# Patient Record
Sex: MALE | Race: OTHER | HISPANIC OR LATINO
[De-identification: names, ages, dates, MRNs, and addresses within clinical notes are randomized per-mention and may not be internally consistent; named-entity substitution may affect disease eponyms.]

---

## 2018-01-30 PROBLEM — Z00.00 ENCOUNTER FOR PREVENTIVE HEALTH EXAMINATION: Status: ACTIVE | Noted: 2018-01-30

## 2018-02-09 ENCOUNTER — APPOINTMENT (OUTPATIENT)
Dept: GASTROENTEROLOGY | Facility: CLINIC | Age: 38
End: 2018-02-09
Payer: COMMERCIAL

## 2018-02-09 VITALS
HEART RATE: 80 BPM | DIASTOLIC BLOOD PRESSURE: 80 MMHG | RESPIRATION RATE: 16 BRPM | SYSTOLIC BLOOD PRESSURE: 130 MMHG | OXYGEN SATURATION: 100 % | BODY MASS INDEX: 28.25 KG/M2 | HEIGHT: 67 IN | WEIGHT: 180 LBS

## 2018-02-09 PROCEDURE — 99203 OFFICE O/P NEW LOW 30 MIN: CPT

## 2018-05-04 LAB
ALBUMIN SERPL ELPH-MCNC: 4.5 G/DL
ALP BLD-CCNC: 65 U/L
ALT SERPL-CCNC: 63 U/L
ANION GAP SERPL CALC-SCNC: 13 MMOL/L
AST SERPL-CCNC: 28 U/L
BASOPHILS # BLD AUTO: 0.03 K/UL
BASOPHILS NFR BLD AUTO: 0.3 %
BILIRUB SERPL-MCNC: 0.4 MG/DL
BUN SERPL-MCNC: 20 MG/DL
CALCIUM SERPL-MCNC: 9.4 MG/DL
CHLORIDE SERPL-SCNC: 103 MMOL/L
CO2 SERPL-SCNC: 24 MMOL/L
CREAT SERPL-MCNC: 0.98 MG/DL
EOSINOPHIL # BLD AUTO: 0.05 K/UL
EOSINOPHIL NFR BLD AUTO: 0.6 %
GLUCOSE SERPL-MCNC: 92 MG/DL
HCT VFR BLD CALC: 41.8 %
HGB BLD-MCNC: 13.7 G/DL
IMM GRANULOCYTES NFR BLD AUTO: 0.3 %
INR PPP: 0.94 RATIO
LYMPHOCYTES # BLD AUTO: 3.26 K/UL
LYMPHOCYTES NFR BLD AUTO: 37.3 %
MAN DIFF?: NORMAL
MCHC RBC-ENTMCNC: 29.7 PG
MCHC RBC-ENTMCNC: 32.8 GM/DL
MCV RBC AUTO: 90.7 FL
MONOCYTES # BLD AUTO: 0.96 K/UL
MONOCYTES NFR BLD AUTO: 11 %
NEUTROPHILS # BLD AUTO: 4.41 K/UL
NEUTROPHILS NFR BLD AUTO: 50.5 %
PLATELET # BLD AUTO: 194 K/UL
POTASSIUM SERPL-SCNC: 4.6 MMOL/L
PROT SERPL-MCNC: 7.2 G/DL
PT BLD: 10.6 SEC
RBC # BLD: 4.61 M/UL
RBC # FLD: 13.8 %
SODIUM SERPL-SCNC: 140 MMOL/L
UREA BREATH TEST QL: POSITIVE
WBC # FLD AUTO: 8.74 K/UL

## 2018-05-14 ENCOUNTER — APPOINTMENT (OUTPATIENT)
Dept: GASTROENTEROLOGY | Facility: GI CENTER | Age: 38
End: 2018-05-14
Payer: COMMERCIAL

## 2018-05-14 ENCOUNTER — OUTPATIENT (OUTPATIENT)
Dept: OUTPATIENT SERVICES | Facility: HOSPITAL | Age: 38
LOS: 1 days | End: 2018-05-14
Payer: COMMERCIAL

## 2018-05-14 DIAGNOSIS — Z86.010 PERSONAL HISTORY OF COLONIC POLYPS: ICD-10-CM

## 2018-05-14 DIAGNOSIS — K92.1 MELENA: ICD-10-CM

## 2018-05-14 PROCEDURE — 45378 DIAGNOSTIC COLONOSCOPY: CPT

## 2018-05-14 PROCEDURE — T1013: CPT

## 2018-09-26 ENCOUNTER — APPOINTMENT (OUTPATIENT)
Dept: GASTROENTEROLOGY | Facility: CLINIC | Age: 38
End: 2018-09-26
Payer: COMMERCIAL

## 2018-09-26 VITALS
WEIGHT: 180 LBS | DIASTOLIC BLOOD PRESSURE: 80 MMHG | SYSTOLIC BLOOD PRESSURE: 110 MMHG | OXYGEN SATURATION: 99 % | HEIGHT: 67 IN | RESPIRATION RATE: 15 BRPM | HEART RATE: 75 BPM | BODY MASS INDEX: 28.25 KG/M2

## 2018-09-26 DIAGNOSIS — A04.8 OTHER SPECIFIED BACTERIAL INTESTINAL INFECTIONS: ICD-10-CM

## 2018-09-26 DIAGNOSIS — K64.8 OTHER HEMORRHOIDS: ICD-10-CM

## 2018-09-26 LAB — UREA BREATH TEST QL: NEGATIVE

## 2018-09-26 PROCEDURE — 99213 OFFICE O/P EST LOW 20 MIN: CPT

## 2018-09-26 RX ORDER — METRONIDAZOLE 500 MG/1
500 TABLET ORAL TWICE DAILY
Qty: 28 | Refills: 0 | Status: DISCONTINUED | COMMUNITY
Start: 2018-05-14 | End: 2018-09-26

## 2018-09-26 RX ORDER — AMOXICILLIN 500 MG/1
500 TABLET, FILM COATED ORAL TWICE DAILY
Qty: 56 | Refills: 0 | Status: DISCONTINUED | COMMUNITY
Start: 2018-05-14 | End: 2018-09-26

## 2018-09-26 RX ORDER — CLARITHROMYCIN 500 MG/1
500 TABLET, FILM COATED ORAL
Qty: 20 | Refills: 0 | Status: DISCONTINUED | COMMUNITY
Start: 2018-05-14 | End: 2018-09-26

## 2018-09-26 RX ORDER — PANTOPRAZOLE 40 MG/1
40 TABLET, DELAYED RELEASE ORAL
Qty: 28 | Refills: 0 | Status: DISCONTINUED | COMMUNITY
Start: 2018-05-14 | End: 2018-09-26

## 2018-09-26 RX ORDER — POLYETHYLENE GLYOCOL 3350, SODIUM CHLORIDE, SODIUM BICARBONATE AND POTASSIUM CHLORIDE 420; 11.2; 5.72; 1.48 G/4L; G/4L; G/4L; G/4L
420 POWDER, FOR SOLUTION NASOGASTRIC; ORAL
Qty: 1 | Refills: 0 | Status: DISCONTINUED | COMMUNITY
Start: 2018-02-09 | End: 2018-09-26

## 2021-03-21 ENCOUNTER — OUTPATIENT (OUTPATIENT)
Dept: OUTPATIENT SERVICES | Facility: HOSPITAL | Age: 41
LOS: 1 days | End: 2021-03-21
Payer: COMMERCIAL

## 2021-03-21 ENCOUNTER — TRANSCRIPTION ENCOUNTER (OUTPATIENT)
Age: 41
End: 2021-03-21

## 2021-03-21 DIAGNOSIS — Z20.828 CONTACT WITH AND (SUSPECTED) EXPOSURE TO OTHER VIRAL COMMUNICABLE DISEASES: ICD-10-CM

## 2021-03-21 LAB — SARS-COV-2 RNA SPEC QL NAA+PROBE: SIGNIFICANT CHANGE UP

## 2021-03-21 PROCEDURE — U0005: CPT

## 2021-03-21 PROCEDURE — U0003: CPT

## 2021-03-21 PROCEDURE — C9803: CPT

## 2021-03-22 DIAGNOSIS — Z20.828 CONTACT WITH AND (SUSPECTED) EXPOSURE TO OTHER VIRAL COMMUNICABLE DISEASES: ICD-10-CM

## 2021-08-12 ENCOUNTER — EMERGENCY (EMERGENCY)
Facility: HOSPITAL | Age: 41
LOS: 1 days | Discharge: HOME | End: 2021-08-12
Attending: EMERGENCY MEDICINE
Payer: COMMERCIAL

## 2021-08-12 VITALS
DIASTOLIC BLOOD PRESSURE: 87 MMHG | HEART RATE: 75 BPM | HEIGHT: 66 IN | RESPIRATION RATE: 18 BRPM | TEMPERATURE: 97 F | SYSTOLIC BLOOD PRESSURE: 149 MMHG | OXYGEN SATURATION: 98 % | WEIGHT: 199.96 LBS

## 2021-08-12 PROCEDURE — 99283 EMERGENCY DEPT VISIT LOW MDM: CPT

## 2021-08-12 RX ORDER — POLYMYXIN B SULF/TRIMETHOPRIM 10000-1/ML
1 DROPS OPHTHALMIC (EYE) EVERY 6 HOURS
Refills: 0 | Status: DISCONTINUED | OUTPATIENT
Start: 2021-08-12 | End: 2021-08-16

## 2021-08-12 RX ADMIN — Medication 1 DROP(S): at 13:43

## 2021-08-12 NOTE — ED PROVIDER NOTE - CARE PROVIDER_API CALL
SUREKHA ELY  Ophthalmology  [address] [place] [address] [address] [place] [id number]  Phone: [phone]  Fax: [phone]  Follow Up Time:    SUREKHA ELY  Ophthalmology  [address] [place] [address] [address]  [place] [address]  Phone: [phone]  Fax: [phone]  Follow Up Time:     Wilman Valverde (MD; PhD)  Ophthalmology  [address] [facility]  [address]  [place] [address]  Phone: [phone]  Fax: [phone]  Follow Up Time:

## 2021-08-12 NOTE — ED PROVIDER NOTE - OBJECTIVE STATEMENT
41 y,o male NO Sig Pmh states in Am felt some foreign body on his left eye and as he rubbed his left eye get worse with left eye redness. and feeling foreign body sensation . he denies any eye contact or eye lens using . denies any loss of vision or floating object

## 2021-08-12 NOTE — ED PROVIDER NOTE - PROVIDER TOKENS
PROVIDER:[TOKEN:[[id number]]] PROVIDER:[TOKEN:[[id number]]],PROVIDER:[TOKEN:[[id number]]]

## 2021-08-12 NOTE — ED PROVIDER NOTE - NSFOLLOWUPINSTRUCTIONS_ED_ALL_ED_FT
Abrasión corneal    LO QUE NECESITA SABER:    ¿Qué es lissy abrasión corneal?Lissy abrasión corneal es lissy lesión, tim un rasguño, en la córnea del clarissa. La córnea es la capa transparente que cubre la parte frontal de salamanca clarissa.    Anatomía del clarissa         ¿Qué causa lissy abrasión corneal?  •Lentes de contacto que no son adecuados, están dañados o que ha usado por demasiado tiempo      •Un rasguño con lissy uña o con algún objeto tim un lápiz o carmen de un árbol      •Objetos tim mugre, arena o virutas de metal que entran en el clarissa      •Friccionarse los ojos con mucha fuerza      •Quemaduras químicas o por exposición a la anuja causadas por el calor extremo      ¿Cuáles son los signos y síntomas de lissy abrasión corneal?  •Dolor, enrojecimiento o hinchazón del clarissa      •Dificultad para abrir el clarissa      •Más lágrimas que lo normal      •Sensación de tener algo en el clarissa      •Visión borrosa      •Sensibilidad a la anuja brillante      •Dolor de noah      ¿Cómo se diagnostica lissy abrasión corneal?Salamanca profesional de suzette le examinará debby ojos. Si encuentra algo en salamanca clarissa que le está rasguñando la córnea, salamanca médico procederá removerlo. Es probable que le ponga un tinte en el clarissa y lo examine con lissy anuja. La anuja y el tinte pueden ayudarle a salamanca médico a alex si salamanca córnea ha sufrido rasguños.             ¿Cómo se trata lissy abrasión corneal?Para ayudar a prevenir lissy infección en el clarissa, es posible que le receten antibióticos en forma de gotas o ungüento para los ojos. Es probable que también le den gotas oftálmicas para disminuir el dolor. Si el rasguño es pequeño, puede sanar en 1 a 2 días. Es posible que si el rasguño es más sherly demore más tiempo en sanar.    ¿Qué puedo hacer para cuidar mis ojos?  •Hágase exámenes de la vista en forma regular.Tenga lissy revisión ocular al menos cada año.      •Consuma alimentos saludables.Las frutas y verduras frescas ricas en vitaminas A y C y podrían ayudarle con la vista. Los alimentos tim la batata, el albaricoque y la zanahoria son ricos en nutrientes para los ojos.  Ko de vitamina A       Ko de vitamina C           •Cuide hitesh los lentes de contactos o anteojos.Almacene, limpie y use los lentes de contacto o anteojos tim se le indique. Reemplácelos con la frecuencia que el médico le recomiende.      •Disminuya la fatiga visual.Descanse los ojos, especialmente después de leer o usar la computadora por mucho tiempo. Duerma lo suficiente por la noche. Use luces que reduzcan el deslumbramiento en el hogar, escuela o lugar de trabajo.      •Use lentes de sol oscuros.Scanlon ayudará a evitar el dolor y la sensibilidad a la anuja. Asegúrese de que los lentes de sol tengan protección contra los sandra UVA y UVB. Scanlon le protegerá los ojos cuando esté al aire silke.      •Use gotas para los ojos de forma akers.Si salamanca plan de tratamiento incluye gotas oftálmicas, es importante utilizarlas tim se indica. Salamanca médico puede darle instrucciones detalladas a seguir. Las gotas oftálmicas también pueden venir con instrucciones de seguridad. Siga todas las instrucciones para ayudar a prevenir lissy infección. No permita que la punta del envase toque salamanca clarissa. Los gérmenes de debby ojos podrían propagarse al envase del medicamento.  Pasos 1 2 3 4           ¿Cómo puedo ayudar a prevenir las lesiones corneales?  •Si debby ojos se sienten secos o irritados, retire los lentes de contacto de debby ojos.      •Lávese las pedro si necesita tocarse los ojos o el jesus.  Lavado de pedro           •Córtese las uñas para que no se pueda rasguñarse el clarissa.      •Use anteojos protectores cuando trabaja con químicos, gerardo, polvo o metal.      •Use anteojos protectores cuando practica deportes.      •No use debby lentes de contacto por más tiempo del debido.      •No duerma con debby lentes de contacto puestos.      •No use maquillaje con escarcha. La escarcha se puede meter en los ojos y por debajo de los lentes de contacto.      ¿Cuándo alicia llamar a mi médico?  •Salamanca dolor de clarissa o vista empeoran.      •Usted tiene secreción amarilla o catia saliendo de salamanca clarissa.      •Usted tiene preguntas o inquietudes acerca de salamanca condición o cuidado.      ACUERDOS SOBRE SALAMANCA CUIDADO:    Usted tiene el derecho de ayudar a planear salamanca cuidado. Aprenda todo lo que pueda sobre salamanca condición y tim darle tratamiento. Discuta debby opciones de tratamiento con debby médicos para decidir el cuidado que usted desea recibir. Usted siempre tiene el derecho de rechazar el tratamiento.       © Copyright [employer] 2021

## 2021-08-12 NOTE — ED PROVIDER NOTE - PHYSICAL EXAMINATION
Const: AOX3 nontoxic appearing, no apparent respiratory or physical distress. Stable gait   HEENT: NC/AT. Moist mucous membranes.  Eyes: JANE. EOMI,  B/l no signs of hyphema   Left eye mild conjunctiva redness , vanesaer applied 2 drop tetracaine on the left eye and fluorecin exam with uptake on cornea noted 3 abrasion , globe intact      Right eye WNl   Neck: Soft and supple. Full ROM without pain.  Resp: Speaking in full sentences. No evidence of respiratory distress.   Skin: No rashes, abrasions or lacerations.  Neuro: Awake, alert & oriented x 3. Moves all extremities symmetrically.

## 2021-08-12 NOTE — ED PROVIDER NOTE - PATIENT PORTAL LINK FT
You can access the FollowMyHealth Patient Portal offered by [facility] by registering at the following website: [email or web address]. By joining [facility]’s FollowMyHealth portal, you will also be able to view your health information using other applications (apps) compatible with our system.

## 2021-08-12 NOTE — ED PROVIDER NOTE - ATTENDING CONTRIBUTION TO CARE
HPI: 40yo male with no PMH P/w L eye FB sensation. States his eyelashes curl down and sometimes gets in his eye, feels like this was an eyelash. Denies visual changes, does not use contact lenses.     PE:  Gen: NAD  Head: NCAT  HEENT: Oral mucosa moist, +L conjunctival injection. +Fluorescein uptake visualized on Woods lamp exam at 6 o'clock and 3 oclock position on L, PERRL,  CV: normal perfusion  Resp: no respiratory distress  Neuro: No focal neuro deficits  MSK: FROM all 4 extremities, no deformity  Skin: No rash, no bruising  Psych: Normal affect    MDM: Corneal abrasion, tx with polytrim. Stacey Bull DO     I performed a history and physical exam of the patient and discussed their management with the advanced care provider. I reviewed the advanced care provider's note and agree with the documented findings and plan of care. My medical decision making and objective findings are found above.

## 2021-08-12 NOTE — ED PROVIDER NOTE - CLINICAL SUMMARY MEDICAL DECISION MAKING FREE TEXT BOX
fluorecin exam with left eye corneal abrasion .   feeling better after 20 cc left eye irigation and use the Q tip to swab the left upper eye lid W.o foreign body  Polytrim eye drop  tx

## 2021-08-12 NOTE — ED ADULT TRIAGE NOTE - CHIEF COMPLAINT QUOTE
Patient arrived to ED today with c/o left eye redness, pain, and feels like something is in his eye.

## 2022-03-10 ENCOUNTER — EMERGENCY (EMERGENCY)
Facility: HOSPITAL | Age: 42
LOS: 1 days | Discharge: HOME | End: 2022-03-10
Attending: EMERGENCY MEDICINE
Payer: COMMERCIAL

## 2022-03-10 VITALS
OXYGEN SATURATION: 98 % | DIASTOLIC BLOOD PRESSURE: 85 MMHG | HEART RATE: 116 BPM | TEMPERATURE: 98 F | RESPIRATION RATE: 18 BRPM | WEIGHT: 195.11 LBS | SYSTOLIC BLOOD PRESSURE: 120 MMHG | HEIGHT: 67 IN

## 2022-03-10 VITALS — TEMPERATURE: 100 F

## 2022-03-10 DIAGNOSIS — Z90.49 ACQUIRED ABSENCE OF OTHER SPECIFIED PARTS OF DIGESTIVE TRACT: Chronic | ICD-10-CM

## 2022-03-10 PROBLEM — Z78.9 OTHER SPECIFIED HEALTH STATUS: Chronic | Status: ACTIVE | Noted: 2021-08-12

## 2022-03-10 LAB
ALBUMIN SERPL ELPH-MCNC: 5.3 G/DL — HIGH (ref 3.3–5.2)
ALP SERPL-CCNC: 108 U/L — SIGNIFICANT CHANGE UP (ref 40–120)
ALT FLD-CCNC: 249 U/L — HIGH
ANION GAP SERPL CALC-SCNC: 18 MMOL/L — HIGH (ref 5–17)
AST SERPL-CCNC: 74 U/L — HIGH
BASOPHILS # BLD AUTO: 0 K/UL — SIGNIFICANT CHANGE UP (ref 0–0.2)
BASOPHILS NFR BLD AUTO: 0 % — SIGNIFICANT CHANGE UP (ref 0–2)
BILIRUB SERPL-MCNC: 0.4 MG/DL — SIGNIFICANT CHANGE UP (ref 0.4–2)
BUN SERPL-MCNC: 22.6 MG/DL — HIGH (ref 8–20)
CALCIUM SERPL-MCNC: 9.8 MG/DL — SIGNIFICANT CHANGE UP (ref 8.6–10.2)
CHLORIDE SERPL-SCNC: 98 MMOL/L — SIGNIFICANT CHANGE UP (ref 98–107)
CO2 SERPL-SCNC: 18 MMOL/L — LOW (ref 22–29)
CREAT SERPL-MCNC: 1.25 MG/DL — SIGNIFICANT CHANGE UP (ref 0.5–1.3)
EGFR: 74 ML/MIN/1.73M2 — SIGNIFICANT CHANGE UP
EOSINOPHIL # BLD AUTO: 0 K/UL — SIGNIFICANT CHANGE UP (ref 0–0.5)
EOSINOPHIL NFR BLD AUTO: 0 % — SIGNIFICANT CHANGE UP (ref 0–6)
GIANT PLATELETS BLD QL SMEAR: PRESENT — SIGNIFICANT CHANGE UP
GLUCOSE SERPL-MCNC: 157 MG/DL — HIGH (ref 70–99)
HCT VFR BLD CALC: 53.5 % — HIGH (ref 39–50)
HGB BLD-MCNC: 18 G/DL — HIGH (ref 13–17)
HIV 1 & 2 AB SERPL IA.RAPID: SIGNIFICANT CHANGE UP
LYMPHOCYTES # BLD AUTO: 0.32 K/UL — LOW (ref 1–3.3)
LYMPHOCYTES # BLD AUTO: 2.6 % — LOW (ref 13–44)
MANUAL SMEAR VERIFICATION: SIGNIFICANT CHANGE UP
MCHC RBC-ENTMCNC: 30.6 PG — SIGNIFICANT CHANGE UP (ref 27–34)
MCHC RBC-ENTMCNC: 33.6 GM/DL — SIGNIFICANT CHANGE UP (ref 32–36)
MCV RBC AUTO: 91 FL — SIGNIFICANT CHANGE UP (ref 80–100)
MONOCYTES # BLD AUTO: 1.17 K/UL — HIGH (ref 0–0.9)
MONOCYTES NFR BLD AUTO: 9.6 % — SIGNIFICANT CHANGE UP (ref 2–14)
NEUTROPHILS # BLD AUTO: 10.47 K/UL — HIGH (ref 1.8–7.4)
NEUTROPHILS NFR BLD AUTO: 86.1 % — HIGH (ref 43–77)
PLAT MORPH BLD: ABNORMAL
PLATELET # BLD AUTO: 224 K/UL — SIGNIFICANT CHANGE UP (ref 150–400)
POTASSIUM SERPL-MCNC: 4.2 MMOL/L — SIGNIFICANT CHANGE UP (ref 3.5–5.3)
POTASSIUM SERPL-SCNC: 4.2 MMOL/L — SIGNIFICANT CHANGE UP (ref 3.5–5.3)
PROT SERPL-MCNC: 9.4 G/DL — HIGH (ref 6.6–8.7)
RBC # BLD: 5.88 M/UL — HIGH (ref 4.2–5.8)
RBC # FLD: 12.8 % — SIGNIFICANT CHANGE UP (ref 10.3–14.5)
RBC BLD AUTO: NORMAL — SIGNIFICANT CHANGE UP
SODIUM SERPL-SCNC: 134 MMOL/L — LOW (ref 135–145)
VARIANT LYMPHS # BLD: 1.7 % — SIGNIFICANT CHANGE UP (ref 0–6)
WBC # BLD: 12.16 K/UL — HIGH (ref 3.8–10.5)
WBC # FLD AUTO: 12.16 K/UL — HIGH (ref 3.8–10.5)

## 2022-03-10 PROCEDURE — 99284 EMERGENCY DEPT VISIT MOD MDM: CPT

## 2022-03-10 PROCEDURE — 36415 COLL VENOUS BLD VENIPUNCTURE: CPT

## 2022-03-10 PROCEDURE — 76705 ECHO EXAM OF ABDOMEN: CPT | Mod: 26,RT

## 2022-03-10 PROCEDURE — 36000 PLACE NEEDLE IN VEIN: CPT

## 2022-03-10 PROCEDURE — 99284 EMERGENCY DEPT VISIT MOD MDM: CPT | Mod: 25

## 2022-03-10 PROCEDURE — 86703 HIV-1/HIV-2 1 RESULT ANTBDY: CPT

## 2022-03-10 PROCEDURE — 85025 COMPLETE CBC W/AUTO DIFF WBC: CPT

## 2022-03-10 PROCEDURE — 80053 COMPREHEN METABOLIC PANEL: CPT

## 2022-03-10 PROCEDURE — 87507 IADNA-DNA/RNA PROBE TQ 12-25: CPT

## 2022-03-10 PROCEDURE — 76705 ECHO EXAM OF ABDOMEN: CPT

## 2022-03-10 RX ORDER — LOPERAMIDE HCL 2 MG
2 TABLET ORAL ONCE
Refills: 0 | Status: COMPLETED | OUTPATIENT
Start: 2022-03-10 | End: 2022-03-10

## 2022-03-10 RX ORDER — FAMOTIDINE 10 MG/ML
20 INJECTION INTRAVENOUS DAILY
Refills: 0 | Status: DISCONTINUED | OUTPATIENT
Start: 2022-03-10 | End: 2022-03-14

## 2022-03-10 RX ORDER — SODIUM CHLORIDE 9 MG/ML
1000 INJECTION INTRAMUSCULAR; INTRAVENOUS; SUBCUTANEOUS ONCE
Refills: 0 | Status: COMPLETED | OUTPATIENT
Start: 2022-03-10 | End: 2022-03-10

## 2022-03-10 RX ORDER — ONDANSETRON 8 MG/1
4 TABLET, FILM COATED ORAL ONCE
Refills: 0 | Status: COMPLETED | OUTPATIENT
Start: 2022-03-10 | End: 2022-03-10

## 2022-03-10 RX ADMIN — SODIUM CHLORIDE 1000 MILLILITER(S): 9 INJECTION INTRAMUSCULAR; INTRAVENOUS; SUBCUTANEOUS at 12:00

## 2022-03-10 RX ADMIN — ONDANSETRON 4 MILLIGRAM(S): 8 TABLET, FILM COATED ORAL at 19:12

## 2022-03-10 RX ADMIN — FAMOTIDINE 20 MILLIGRAM(S): 10 INJECTION INTRAVENOUS at 19:12

## 2022-03-10 RX ADMIN — Medication 2 MILLIGRAM(S): at 19:12

## 2022-03-10 NOTE — ED STATDOCS - NS_ ATTENDINGSCRIBEDETAILS _ED_A_ED_FT
I, Orlando Berrios, performed the initial face to face bedside interview with this patient regarding history of present illness, review of symptoms and relevant past medical, social and family history.  I completed an independent physical examination.  I was the provider who initially evaluated this patient.  The history, relevant review of systems, past medical and surgical history, medical decision making, and physical examination was documented by the scribe in my presence and I attest to the accuracy of the documentation. Follow-up on ordered tests (ie labs, radiologic studies) and re-evaluation of the patient's status has been communicated to the ACP.  Disposition of the patient will be based on test outcome and response to ED interventions.

## 2022-03-10 NOTE — ED STATDOCS - OBJECTIVE STATEMENT
42 y/o male with no PMHx s/p appendectomy presents to ED c/o fever. Patient reports last night he developed subjective fever, nausea, 2 episodes of NBNB emesis, and NBNB diarrhea every 30 minutes with more than 10 episodes. Patient took Tylenol at 5am this morning.     Denies abdominal pain, recent travel, allergies, recent abx use 40 y/o male with no PMHx s/p appendectomy presents to ED c/o feeling feverish with nausea and diarrhea since last night. Reports more than 10 episodes of watery diarrhea since onset, without bleeding.  Took tylenol at 0530 today.  Had 2 episodes of NBNB emesis today.  Denies abd pain    Denies recent travel, allergies, recent abx use

## 2022-03-10 NOTE — ED STATDOCS - GASTROINTESTINAL, MLM
abdomen soft, dull to percussion, non-tender and non-distended. Bowel sounds present. abdomen soft, dull to percussion, non-tender and non-distended.

## 2022-03-10 NOTE — ED ADULT NURSE NOTE - OBJECTIVE STATEMENT
Pt is here with c/o abd pain, diarrhea and nausea with subjective fevers, denies vomiting.  Pt also c/o leg cramps that started this morning.

## 2022-03-10 NOTE — ED STATDOCS - PROGRESS NOTE DETAILS
reviewed lab work ultrasound results pt to follow up with pmd/GI doctor outpatient  pt explained dc instructions

## 2022-03-10 NOTE — ED STATDOCS - NSFOLLOWUPINSTRUCTIONS_ED_ALL_ED_FT
please follow up with primary care doctor/GI doctor outpatient  BRAT diet - Bananas rice applesauce toast

## 2022-03-10 NOTE — ED STATDOCS - NSICDXPASTMEDICALHX_GEN_ALL_CORE_FT
PAST MEDICAL HISTORY:  No pertinent past medical history        PAST MEDICAL HISTORY:  No pertinent past medical history

## 2022-03-10 NOTE — ED STATDOCS - CARE PROVIDER_API CALL
Pedro Lakhani)  Gastroenterology; Internal Medicine  [address] [place] [address], [place] [address]  Phone: [phone]  Fax: [phone]  Follow Up Time:

## 2022-03-10 NOTE — ED STATDOCS - PATIENT PORTAL LINK FT
You can access the FollowMyHealth Patient Portal offered by [facility] by registering at the following website: [email or web address]. By joining [email or web address]’s FollowMyHealth portal, you will also be able to view your health information using other applications (apps) compatible with our system.

## 2022-03-10 NOTE — ED STATDOCS - CLINICAL SUMMARY MEDICAL DECISION MAKING FREE TEXT BOX
Diarrhea, with normal examination. Labs, IV fluid bolus, GI PCR. If negative, treatment with imodium.

## 2022-03-11 LAB
CULTURE RESULTS: SIGNIFICANT CHANGE UP
SPECIMEN SOURCE: SIGNIFICANT CHANGE UP

## 2022-03-28 ENCOUNTER — APPOINTMENT (OUTPATIENT)
Dept: GASTROENTEROLOGY | Facility: CLINIC | Age: 42
End: 2022-03-28
Payer: COMMERCIAL

## 2022-03-28 VITALS
HEART RATE: 74 BPM | DIASTOLIC BLOOD PRESSURE: 97 MMHG | BODY MASS INDEX: 30.45 KG/M2 | SYSTOLIC BLOOD PRESSURE: 148 MMHG | TEMPERATURE: 97.8 F | OXYGEN SATURATION: 98 % | HEIGHT: 67 IN | WEIGHT: 194 LBS | RESPIRATION RATE: 14 BRPM

## 2022-03-28 DIAGNOSIS — R79.89 OTHER SPECIFIED ABNORMAL FINDINGS OF BLOOD CHEMISTRY: ICD-10-CM

## 2022-03-28 PROCEDURE — 99204 OFFICE O/P NEW MOD 45 MIN: CPT

## 2022-03-28 RX ORDER — HYDROCORTISONE 25 MG/G
2.5 CREAM TOPICAL TWICE DAILY
Qty: 28.35 | Refills: 3 | Status: DISCONTINUED | COMMUNITY
Start: 2018-05-14 | End: 2022-03-28

## 2022-04-01 PROBLEM — R79.89 ELEVATED LFTS: Status: ACTIVE | Noted: 2022-03-28

## 2022-04-01 NOTE — HISTORY OF PRESENT ILLNESS
[de-identified] : 42 y/o pt presents for evaluation of elevated transaminases .\par Normal ALP and Bilirubin.\par Gave h/o prodrome of nausea diarrhea, he stated that 2 other family members \par had the same symptoms but he had abd pain and sought the ER.\par Denied any h/o having outside food(restaurant etc).\par Denied risk factors for viral hepatitis.\par Denies significant alcohol intake.\par Denies family h/o liver disease.\par Denies hematemesis , melana,jaundice,confusion\par He was taking a supplement which he stopped.

## 2022-04-01 NOTE — ASSESSMENT
[FreeTextEntry1] : Discussed the differential diagnosis of LFT elevation wit the patient including viral hepatitis, drug induced liver injury,metabolic ,genetic etc and the need to confirm the diagnosis via labs.\par Patient stated he had labs drawn with his pcp and he would rather me review them first.\par Awaiting those labs.\par Furthur management pending lab results

## 2022-04-01 NOTE — PHYSICAL EXAM
[Bowel Sounds] : normal bowel sounds [Abdomen Soft] : soft [Abdomen Tenderness] : non-tender [Abdomen Mass (___ Cm)] : no abdominal mass palpated [Abdomen Hernia] : no hernia was discovered [Skin Color & Pigmentation] : normal skin color and pigmentation [] : no rash [Oriented To Time, Place, And Person] : oriented to person, place, and time [Impaired Insight] : insight and judgment were intact [Scleral Icterus] : No Scleral Icterus [Spider Angioma] : No spider angioma(s) were observed [Ascites Tense] : ascites is not tense